# Patient Record
Sex: FEMALE | Race: BLACK OR AFRICAN AMERICAN
[De-identification: names, ages, dates, MRNs, and addresses within clinical notes are randomized per-mention and may not be internally consistent; named-entity substitution may affect disease eponyms.]

---

## 2019-11-16 NOTE — CT
CT ABDOMEN WITH CONTRAST

CT PELVIS WITH CONTRAST:



DATE:

11/16/2019



HISTORY:

39-year-old female with vaginal bleeding and lower abdominal/pelvic pain. Diarrhea, nausea, and emesi
s.



COMPARISON:

none



TECHNIQUE:

IV injection of iodinated contrast media: administered.

Oral contrast media:Not administered



FINDINGS:

There is diffuse fat stranding representing edema throughout the entire pelvic cavity.

There is a complex right adnexal mass containing complex multi septated central cystic central lesion
 measuring approximately 3.5 x 1.5 x 4 cm, surrounded by thick edematous soft tissues. This may

represent a complex (possibly hemorrhagic) right ovarian cyst.



There is mural edema throughout the entire colon consistent with colitis. The appendix is 7 to 8 mm i
n caliber and appears to have mural edema, but this is probably part of the same process causing

the colitis.



No definite discrete focal abscess is identified.



Abdominal aorta, bilateral kidneys, pancreas, adrenals, and spleen, are normal.

Hepatic attenuation is diffusely low, possibly representing fatty liver. No solid or cystic hepatic l
esion. No portal vein thrombosis.

Cholecystectomy clips. Lung bases are clear. No small bowel dilation or pneumoperitoneum.

There are multiple periaortic mildly enlarged retroperitoneal lymph nodes.



IMPRESSION:

1) diffuse edema throughout the entire pelvic cavity. This is nonspecific, but one possibility is PID
 (pelvic inflammatory disease).

2) pancolitis

3) mild to moderate retroperitoneal lymphadenopathy.

4) possible hepatic steatosis.



Reported By: Jose Manuel Palafox 

Electronically Signed:  11/16/2019 11:20 AM

## 2019-11-16 NOTE — PDOC.FPRHP
- History of Present Illness


Chief Complaint: Vaginal Bleeding


History of Present Illness: 


Mrs. Duke is a 38 y/o  female with a PMH significant for HIV 

and Genital Warts who presents to the ED for a 1-2 week history of vaginal 

bleeding. She states that her periods are sporadic and that this bleeding is 

not like her periods, when they do occur. She states that the bleeding is 

usually worse in the morning, is free of visible clots, and is usually 

controlled with 4-6 pads and/or tampons. She states that over the past several 

days she has only had to use 1-2 tampons per day, however. She is unsure if she 

is pregnant. She admits occasional N/V and a foul smelling odor for the past 1-

2 weeks, but denies any new vaginal lesions, vulvar itching, vaginal discharge, 

dysuria, hematuria, syncopal episodes, fevers, chills or diarrhea. 


ED Course: 


While in the ED, Mrs. Duke received a 1L bolus of NS, Gentamycin and 

Clindamycin. Her pain was controlled with Morphine.





Speculum Exam: Minimal blood in the vaginal vault.





Bimanual Exam: Mild cervical motion tenderness.





CT ABD: Diffuse edema throughout abdomen. colitis with a right ovarian mass 

measuring 3.5 x 1.5 x 4 cm - colitis vs. PID.





TVUS: Heterogeneous right ovarian mass measuring 1.9 x 2.4 x 1.9 cm - presumed 

complex cyst with recommend follow-up in 6-8 weeks





- Allergies/Adverse Reactions


 Allergies











Allergy/AdvReac Type Severity Reaction Status Date / Time


 


tetracycline Allergy   Verified 08/15/16 17:45














- Home Medications


 











 Medication  Instructions  Recorded  Confirmed  Type


 


traMADol HCl [Tramadol HCl] 1 tab PO Q4HR PRN 08/15/16 08/15/16 History














- History


PMHx: HIV, Genital Warts


 


PSHx: Cholecystectomy, Right Foot Fx Repair (Unknown)





FHx: Non-contributory


 


Social: EtOH: < 2 per week - Tobacco: "Social" - Drugs: Never





Code Status: Full








- Review of Systems


General: denies: fever/chills, fatigue


Eyes: denies: vision changes


ENT: denies: nasal congestion, rhinorrhea


Respiratory: reports: cough.  denies: shortness of breath


Cardiovascular: denies: chest pain, edema


Gastrointestinal: reports: nausea


Genitourinary: reports: other (See HPI)


Skin: reports: lesions (Genital Warts)


Musculoskeletal: denies: pain, tenderness


Neurological: denies: syncope, seizure, weakness





- Vital signs


BP: [124/88]  HR: [89] RR: [16] Tmax: [99.1] Pox: [97]% on [Room]  Wt: [82 kg] 

  








- Physical Exam


Constitutional: NAD, awake, alert and oriented, well developed


HEENT: normocephalic and atraumatic, PERRLA, EOMI, conjunctiva clear, no 

scleral icterus, grossly normal vision, grossly normal hearing, normal nasal 

mucosa, MMM, oropharynx clear


Neck: supple, FROM, trachea midline, no LAD


Chest: no-tender to palpation, no lesions


Heart: RRR, normal S1/S2, no murmurs/rubs/gallops, pulses present, no edema


Lungs: CTAB, no respiratory distress, good air movement, no rales/rhonchi, no 

wheezing, no retractions


Abdomen: soft, non-tender, bowel sounds present, no masses/distention, no 

hernias, other (Patient localized the pain to her suprapubic region.)


Musculoskeletal: normal structure, ROM grossly normal


Neurological: no focal deficit


Skin: no rash/lesions, no jaundice


Heme/Lymphatic: no unusual bruising or bleeding, no purpura


Psychiatric: normal mood and affect





FMR H&P: Results





- Labs


Result Diagrams: 


 19 08:50





 19 08:50


Lab results: 


 











WBC  13.0 thou/uL (4.8-10.8)  H  19  08:50    


 


Hgb  12.0 g/dL (12.0-16.0)   19  08:50    


 


Hct  35.9 % (36.0-47.0)  L  19  08:50    


 


MCV  101.0 fL (78.0-98.0)  H  19  08:50    


 


Plt Count  470 thou/uL (130-400)  H  19  08:50    


 


Neutrophils %  74.0 % (42.0-75.0)   19  08:50    


 


Sodium  139 mmol/L (136-145)   19  08:50    


 


Potassium  3.9 mmol/L (3.5-5.1)   19  08:50    


 


Chloride  106 mmol/L ()   19  08:50    


 


Carbon Dioxide  22 mmol/L (22-29)   19  08:50    


 


BUN  7 mg/dL (7.0-18.7)   19  08:50    


 


Creatinine  0.82 mg/dL (0.6-1.1)   19  08:50    


 


Glucose  83 mg/dL ()   19  08:50    


 


Calcium  10.1 mg/dL (7.8-10.44)   19  08:50    


 


Total Bilirubin  0.3 mg/dL (0.2-1.2)   19  08:50    


 


AST  21 U/L (5-34)   19  08:50    


 


ALT  22 U/L (8-55)   19  08:50    


 


Alkaline Phosphatase  70 U/L ()   19  08:50    


 


Serum Total Protein  9.3 g/dL (6.0-8.3)  H  19  08:50    


 


Albumin  4.3 g/dL (3.5-5.0)   19  08:50    


 


Urine Ketones  Negative mg/dL (Negative)   19  10:27    


 


Urine Blood  2+  (Negative)  A  19  10:27    


 


Urine Nitrite  Negative  (Negative)   19  10:27    


 


Ur Leukocyte Esterase  75 Katherine/uL (Negative)  A  19  10:27    


 


Urine RBC  21-50 HPF (0-3)  A  19  10:27    


 


Urine WBC  11-20 HPF (0-3)  A  19  10:27    


 


Ur Squamous Epith Cells  0-3 HPF (0-3)   19  10:27    


 


Urine Bacteria  None Seen HPF (None Seen)   19  10:27    














- Radiology Interpretation


  ** CT scan - abdomen


Status: report reviewed by me





  ** CT scan - pelvis


Status: report reviewed by me





  ** Other


Status: report reviewed by me (TVUS)





FMR H&P: A/P





- Plan


1. Abnormal Uterine Bleeding


-Physiologic Bleeding vs. Hemorrhagic Cyst vs. Pelvic Inflammatory Disease


-Physical exam unremarkable


-WBCs: 13


-Patient appears hemodynamically stable - H / Hct: 35.9


-bHCG: Negative


-VP3: Pending


-GC: Pending / Chlamydia: Pending


-UA: +WBCs - No bacteria, nitrites, or LEs


-CT ABD/Pelvis: Diffuse edema and colitis with right ovarian mass - hemorrhagic 

cyst vs. functional cyst and colitis


-TVUS: Right ovarian mass 


-s/p Gentamycin and Clindamycin 





2. HIV


-Continue home Biktarvy regimen





3. Genital Warts


-No treatment indicated at this time





Code Status: Full


Diet: Heart Healthy


Activity: Ad Geraldine


VTE PPx: SCDs





Dispo: Admit patient to the Gyn Floor for observation. Ensure hemodynamic 

stablility and await results of GC / Chlamydia swab and correlate clinically. 

No additional imaging indicated at this time. Expected LOS < 24H





FMR H&P: Upper Level





- Plan


Date/Time: 19 1525








Charles BROCK DO, have evaluated this patient and agree with findings/plan 

as outlined by intern resident. Pertinent changes/additions are listed here.








This is a 40 yo  with a pmh of HIV and genital warts who is being seen by 

Dr. Rodriguez for her HIV presents to the ER with a cc of lower abdominal pain, 

vaginal bleeding. She states that the pain is in her lower abdomen with some 

right sided radiation. In addition, she states she has been having heavy 

bleeding, soaking 7 tampons in a day. She states the bleeding is improving at 

this point some and is beginning to darken. She reports irregular periods since 

she had her last child. She also reports she is currently homeless, living with 

her daughter. She denies current drug use but endorses occasional alcohol and 

tobacco use.








Objective


VSS


Cardio: RRR, no murmur


Resp: CTAB, no adventitious sounds


Abdomen: BS+, tenderness to the right lower quadrant, no rebound














Please see intern note for further information.





A/P





Abnormal uterine bleeding


-Admit to medical obs


-Hemodynamically stable, H/H stable


-Possible hemorrhagic cyst resulting in bleeding


-DDX includes perimenopausal. Ovarian torsion r/u based on ultrasound


-Monitor pt overnight and likely discharge in the AM, plan for follow up with 

PCP and OB/GYN 


-Negative VP3


-Pending GC/chlamydia, holding abx for now but will continue if needed





HIV


-Pt follows with Dr. Rodriguez but is unsure of her CD4 count


-Likely not contributing to the above





Mild leukocytosis


-Pt is afebrile with no acute abdomen, likely 2/2 ruptured cyst





Addendum - Attending





- Attending Attestation


Date/Time: 19





I personally evaluated the patient and discussed the management with Dr. Rodriguez


I agree with the History, Examination, Assessment and Plan documented above 

with any addition or exceptions noted below - This is a 40 yo  with a pmh 

of HIV and genital warts who is being seen by Dr. Rodriguez for her HIV presents to 

the ER with a c/o lower abdominal pain and vaginal bleeding. She states that 

the pain is in her lower abdomen midline and with some right sided radiation. 

In addition, she states she has been having heavy bleeding, soaking 7 tampons 

in a day. She states the bleeding is improving at this point some and is 

beginning to darken. She reports irregular periods since she had her last 

child. States that this bleeding is unlike her typical menses. Denies any fever/

chills. Occ night sweats. Denies any diarrhea. Did have 1 episode of N/V in ER 

but attributes this to the pain. States that she typically does not have jpain 

or clots with her menses and this episode is atypical. Denies any vaginal 

discharge prior to the bleeding. PMH/PSH/ROS/SH reviewed and agree with resident

's documentation;. Afebrile VSS. Exam repeated by me and agree with resident's 

documentation except- (+) suprapubic tenderness and mild RLQ tenderness; no 

rebound or guarding. Bimanual exam (+) CMT; (+) uterine and right adnexal 

tenderness. CT with fat stranding throughout pelvic cavity. Also evidence of 

mural edema throughout colon c/w colitis. TV USG- right complex possibly 

hemorrhagic cyst. A/P Abd pain- possibly secondary to cyst versus PID; will 

continue abx for now and monitor response. Toradol for pain. GC/CT pending.

## 2019-11-16 NOTE — ULT
TRANSABDOMINAL AND ENDOVAGINAL PELVIC ULTRASOUND:



HISTORY:

Pelvic pain.



COMPARISON: 

None.



TECHNIQUE: 

Transabdominal and endovaginal imaging of the pelvis is performed. Ovaries are interrogated with gray
scale, color flow, Doppler imaging and spectral wave form analysis.



FINDINGS:

Uterus: No myometrial masses.

Uterus measuring: 8.3 x 4.6 x 5.0 cm.

Endometrium: Poorly defined.

Endometrium diameter: Cannot be assessed due to poor definition.

Free fluid: None.

Right ovary: There is a central hypoechoic focus measuring 1.9 x 2.4 x 1.9 cm. There is heterogeneous
 echotexture along the periphery.

Right ovary measurement: 6.5 x 4.4 x 6.9 cm.

Left ovary: Normal echotexture.

Left ovary measurements: 1.6 x 3.0 x 2.9 cm.



Ovarian Doppler:

There is vascular flow to both ovaries. Note, there is significant vascularity along the peripheral h
eterogeneous component of the right ovary. The central hypoechoic focus has absent vascularity.



IMPRESSION: 

1. Presumed complex, possibly hemorrhagic right ovarian cyst. Follow-up ultrasound in 6-8 weeks.

2. Suboptimal evaluation of the endometrium.



Transcribed Date/Time: 11/16/2019 12:51 PM



Reported By: Mabel Esteban 

Electronically Signed:  11/16/2019 1:32 PM

## 2019-11-17 NOTE — PDOC.FM
- Subjective


Subjective: 


She says she is feeling better this morning. She said pain was suprapubic and 10

/10 when she came in and now is currently 3/10. Did not get much sleep 

overnight due to people coming in. Last BM was yesterday. She is hungry this 

morning.





- Objective


MAR Reviewed: Yes


Vital Signs & Weight: 


 Vital Signs (12 hours)











  Temp Pulse Resp BP Pulse Ox


 


 19 00:09  98.2 F  77  18  93/59 L 


 


 19 19:50  97.7 F  82  20  115/76  100








 Weight











Weight                         81.65 kg














I&O: 


 











 11/15/19 11/16/19 11/17/19





 06:59 06:59 06:59


 


Intake Total   101


 


Balance   101











Result Diagrams: 


 19 04:24





 19 04:24





Phys Exam





- Physical Examination


Constitutional: NAD


HEENT: PERRLA, moist MMs, sclera anicteric


Neck: full ROM


Respiratory: clear to auscultation bilateral


Cardiovascular: RRR, no significant murmur, no rub


Gastrointestinal: soft, no distention


hypoactive bowel sounds, TTP over suprapubic area


Musculoskeletal: no edema, pulses present


Neurological: moves all 4 limbs


Psychiatric: normal affect


Skin: no rash, normal turgor, cap refill <2 seconds





Dx/Plan


(1) Abnormal uterine bleeding


Code(s): N93.9 - ABNORMAL UTERINE AND VAGINAL BLEEDING, UNSPECIFIED   Status: 

Acute   





(2) HIV (human immunodeficiency virus infection)


Code(s): B20 - HUMAN IMMUNODEFICIENCY VIRUS [HIV] DISEASE   Status: Acute   





(3) Leukocytosis


Code(s): D72.829 - ELEVATED WHITE BLOOD CELL COUNT, UNSPECIFIED   Status: Acute

   





(4) Genital warts


Code(s): A63.0 - ANOGENITAL (VENEREAL) WARTS   Status: Acute   





- Plan


Plan: 


Mrs. Duke is a 40 y/o AA female with a PMH significant for HIV and Genital 

Warts who presents to the ED for a 1-2 week history of vaginal bleeding.





1. Abnormal Uterine Bleeding


* Physiologic Bleeding vs. Hemorrhagic Cyst vs. Pelvic Inflammatory Disease


* WBCs: 13 > 7.0


* Patient is hemodynamically stable - H / Hct: 35.9


* bHCG: Negative


* VP3: Negative


* GC / Chlamydia: Pending


* UA: +WBCs - No bacteria, nitrites, or LEs


* CT ABD/Pelvis: Diffuse edema and pancolitis with right ovarian mass - 

hemorrhagic cyst vs. functional cyst and colitis


* TVUS: Right ovarian mass, f/u in 6-8 wks


* Received Gentamycin and Clindamycin 





2. HIV


* Continue home Biktarvy regimen


* Pt follows with Dr. Rodriguez but is unsure of her CD4 count





3. Genital Warts


* No treatment indicated at this time





4.Mild leukocytosis- Resolved


* Pt is afebrile with no acute abdomen, likely 2/2 ruptured cyst





Code Status: Full


Diet: HH


Activity: Ad Geraldine


VTE PPx: SCDs





Dispo: Admit patient to the Gyn Floor for observation. Await results of GC / 

Chlamydia swab. Expected LOS < 24H.  Plan for follow up with PCP and OB/GYN




















Addendum - Attending





- Attending Attestation


Date/Time: 19 1971





I personally evaluated the patient and discussed the management with Dr. JANINA Del Rio


I agree with the History, Examination, Assessment and Plan documented above 

with any addition or exceptions noted below - Patient feeling better this 

morning. States that pain has decreased. 3/10 this morning. Hungry. No N/V/D. 

Afebrile. VSS. A/P: 1) Possible PID- pain improved; cotninue IV abx for now and 

recheck late this afternoon. If continues to be doing well, will change to po 

abx and d/c home for outpatient follow-up.

## 2019-11-18 NOTE — DIS
DATE OF ADMISSION:  11/16/2019



DATE OF DISCHARGE:  11/17/2019



RESIDENT:  Rich Del Rio MD



ADMITTING ATTENDING:  Amanda Enriquez MD



DISCHARGE ATTENDING:  Amanda Enriquez MD



CONSULTS:  None.



PROCEDURES

1. Abdominal and pelvis CT (11/16): shows diffuse edema throughout the entire

pelvic cavity, nonspecific, but possibility is PID. 

    a. Pancolitis.

    b. Mild-to-moderate retroperitoneal lymphadenopathy.

    c. Possibly hepatic steatosis.

2. Pelvic ultrasound (11/16): showed presumed complex possible hemorrhagic right

ovarian cyst.  Followup ultrasound in 6 to 8 weeks. 



PRIMARY DIAGNOSES

1. Abnormal uterine bleeding.

2. Human immunodeficiency virus.

3. Genital warts.

4. Mild leukocytosis.



DISCHARGE MEDICATIONS

1. Azithromycin 250 mg p.o. daily for 7 days, except for 1st day 500 mg.

2. Clindamycin 450 mg p.o. q.6 hours for 14 days. 



Discontinued medications

1. Tylenol.

2. IV clindamycin.

3. IV gentamicin.

4. Toradol.

5. IV fluids.

6. Zofran.

7. Ambien.



HISTORY OF PRESENT ILLNESS:  

Ms. Duke is a 39-year-old  female with

past medical history significant for HIV and Genital warts, who presents to the 
ED

for 1 to 2 weeks history of vaginal bleeding.  She states her periods are 
sporadic

and is not like her periods when they do occur.  She states that the bleeding is

usually worse in the morning.  It is free of visible clots and she has only had 
to

use 1 to 2 tampons per day.  However, she is unsure she is pregnant, she admits 
to

occasional nausea, vomiting, and a foul smelling odor for the past 1 to 2 weeks
, but

denies any new vaginal lesions, vulvar itching, vaginal discharge, dysuria,

hematuria, syncopal episodes, fevers, chills, or diarrhea. 



In the ED, Ms. Duke received 1 L bolus of normal saline, gentamicin, and

clindamycin.  Her pain was controlled with morphine.  



Spec exam showed minimal blood in the vaginal vault. 



Bimanual showed mild cervical motion tenderness.  



CT of abdomen and transvaginal ultrasound were performed and our results are 
noted above. 



1. Abnormal uterine bleeding.

* Physiologic bleeding versus hemorrhagic cyst versus PID.

* White blood cell count was 13 on admission, but resolved to 7 before 
discharge.

* The patient was hemodynamically stable with a hemoglobin of 12 and hematocrit 
of 35.9. 

* Beta-hCG was negative.

* VP3 negative.

* GC and chlamydia are pending.

* UA was positive for white blood cells and leukocyte esterase, but negative 
for bacteria or any nitrites.  

* Urine culture was negative.

* CT abdomen as noted above.

* Received gentamicin and clindamycin.

* Was discharged with azithromycin and clindamycin.



2. HIV.

* Continue Biktarvy regimen.

* Followed up by Dr. Rodriguez.  Unsure of CD4 count.



3. Genital warts.  

* No treatment indicated at this time.



4. Mild leukocytosis, resolved.  

* The patient is afebrile with no acute abdomen likely secondary to ruptured 
cyst. 



DISPOSITION:  Stable.



DISCHARGE INSTRUCTIONS:  

1. Location:  Home.

2. Diet:  Regular.

3. Activity:  As tolerated.

4. Followup:  Follow up with Dr. Yu in 7 days of discharge.







Job ID:  [id number]



[facility] HospitalWANDA

## 2020-01-03 NOTE — ULT
TRANSABDOMINAL AND TRANSVAGINAL PELVIC ULTRASOUND:

 

INDICATIONS:

Follow up ovarian cyst.

 

COMPARISON:

11/16/2019

 

FINDINGS:

Gray-scale, color Doppler and spectral Doppler images were obtained via a transabdominal and transvag
inal approach.

 

The uterus measures 7.4 x 4.3 x 4.2 cm. The total uterine volume is 70.87 mL. The endometrial stripe 
is 2 mm.

 

The right ovary measures 3.2 x 2.1 x 2.1 cm. The left ovary measures 2.5 x 1.2 x 1.5 cm. There is nor
mal vascular flow to both ovaries. 

 

The previously seen complex cyst in the right ovary is reduced in size, now measuring 0.7 x 0.8 x 0.7
 cm. Less complexity is seen within the cyst. No free fluid is identified.

 

IMPRESSION:

Resolving complex right ovarian hemorrhagic cyst. Residual small cyst remains within the central aspe
ct of the right ovary, measuring up to 0.8 cm. Previously this complex cyst measured up to 2.4 cm.

 

POS: OFF

## 2021-01-07 ENCOUNTER — HOSPITAL ENCOUNTER (OUTPATIENT)
Dept: HOSPITAL 92 - LABBT | Age: 41
Discharge: HOME | End: 2021-01-07
Attending: SPECIALIST
Payer: MEDICARE

## 2021-01-07 DIAGNOSIS — A63.0: ICD-10-CM

## 2021-01-07 DIAGNOSIS — Z21: ICD-10-CM

## 2021-01-07 DIAGNOSIS — Z20.822: ICD-10-CM

## 2021-01-07 DIAGNOSIS — Z01.812: Primary | ICD-10-CM

## 2021-01-07 LAB
ANION GAP SERPL CALC-SCNC: 17 MMOL/L (ref 10–20)
BUN SERPL-MCNC: 7 MG/DL (ref 7–18.7)
CALCIUM SERPL-MCNC: 9.4 MG/DL (ref 7.8–10.44)
CHLORIDE SERPL-SCNC: 108 MMOL/L (ref 98–107)
CO2 SERPL-SCNC: 21 MMOL/L (ref 22–29)
CREAT CL PREDICTED SERPL C-G-VRATE: 0 ML/MIN (ref 70–130)
GLUCOSE SERPL-MCNC: 77 MG/DL (ref 70–105)
HGB BLD-MCNC: 13 G/DL (ref 12–16)
MCH RBC QN AUTO: 33.4 PG (ref 27–33)
MCV RBC AUTO: 101.3 FL (ref 80–100)
PLATELET # BLD AUTO: 217 10X3/UL (ref 130–400)
POTASSIUM SERPL-SCNC: 4 MMOL/L (ref 3.5–5.1)
PREGS CONTROL BACKGROUND?: (no result)
PREGS CONTROL BAR APPEAR?: YES
RBC # BLD AUTO: 3.89 10X6/UL (ref 3.9–5.2)
SODIUM SERPL-SCNC: 142 MMOL/L (ref 136–145)
WBC # BLD AUTO: 5.4 10X3/UL (ref 4.5–11)

## 2021-01-07 PROCEDURE — 80048 BASIC METABOLIC PNL TOTAL CA: CPT

## 2021-01-07 PROCEDURE — 87635 SARS-COV-2 COVID-19 AMP PRB: CPT

## 2021-01-07 PROCEDURE — 85027 COMPLETE CBC AUTOMATED: CPT

## 2021-01-07 PROCEDURE — 84703 CHORIONIC GONADOTROPIN ASSAY: CPT

## 2021-01-07 PROCEDURE — U0003 INFECTIOUS AGENT DETECTION BY NUCLEIC ACID (DNA OR RNA); SEVERE ACUTE RESPIRATORY SYNDROME CORONAVIRUS 2 (SARS-COV-2) (CORONAVIRUS DISEASE [COVID-19]), AMPLIFIED PROBE TECHNIQUE, MAKING USE OF HIGH THROUGHPUT TECHNOLOGIES AS DESCRIBED BY CMS-2020-01-R: HCPCS

## 2021-01-12 ENCOUNTER — HOSPITAL ENCOUNTER (OUTPATIENT)
Dept: HOSPITAL 92 - SDC | Age: 41
Discharge: HOME | End: 2021-01-12
Attending: SPECIALIST
Payer: MEDICARE

## 2021-01-12 VITALS — BODY MASS INDEX: 29.3 KG/M2

## 2021-01-12 DIAGNOSIS — Z79.899: ICD-10-CM

## 2021-01-12 DIAGNOSIS — Z88.1: ICD-10-CM

## 2021-01-12 DIAGNOSIS — A63.0: Primary | ICD-10-CM

## 2021-01-12 DIAGNOSIS — Z21: ICD-10-CM

## 2021-01-12 PROCEDURE — S0020 INJECTION, BUPIVICAINE HYDRO: HCPCS

## 2021-01-12 PROCEDURE — 88305 TISSUE EXAM BY PATHOLOGIST: CPT

## 2021-01-12 PROCEDURE — 0H58XZZ DESTRUCTION OF BUTTOCK SKIN, EXTERNAL APPROACH: ICD-10-PCS | Performed by: SPECIALIST

## 2021-01-12 NOTE — OP
DATE OF PROCEDURE:  01/12/2021



PREOPERATIVE DIAGNOSES:  Perianal and vulvar extensive condyloma.



POSTOPERATIVE DIAGNOSES:  Perianal and vulvar extensive condyloma.



OPERATION PERFORMED:  Excision and electrodesiccation of extensive condylomatous

disease of the bilateral vulva and circumferentially around the anus. 



ANESTHESIA:  General endotracheal.



INDICATIONS:  The patient is a 40-year-old black female who is HIV positive.  She

presented with extensive condylomatous disease in her genital and perianal area.

She was taken to the operating room at this time for surgical treatment of this. 



DESCRIPTION OF OPERATION:  Informed consent was obtained.  Patient taken to the

operating where general anesthesia was obtained with patient in supine position.

She was placed in dorsal lithotomy position.  Perianal and genital area were prepped

with Betadine, draped in sterile fashion.  Local anesthetic was infiltrated using

0.25% Marcaine with epinephrine in all areas of where the disease was located.  I

then began a fairly lengthy and meticulous process of excising each area of the

condyloma.  All of the genital condyloma on bilateral vulva were excised

individually.  The perianal area was a confluent area of condyloma in a

circumferential fashion.  I felt this was too much to excise.  I therefore excised 3

separate large patches of this and then electrodesiccated all of the condyloma

between these areas. 



The [employer] viral aspiration system was used throughout the operation to minimize

aerosolized virus.  All bleeding was controlled with electrocautery after various

areas were excised.  I then closed all of the defect to the extent possible with

interrupted sutures of 3-0 chromic.  I smeared bacitracin over each area and placed

dry gauze and mesh pants.  There were no complications.  Blood loss was negligible.

The patient tolerated the procedure well and was taken to the recovery room in

stable condition. 







Job ID:  [id number]